# Patient Record
(demographics unavailable — no encounter records)

---

## 2020-10-16 NOTE — NUR
CARE ASSUMED, PT RESTING ON STRETCHER WITH EYES CLOSED, NO S/S OF DISTRESS OR
DISCOMFORT, CALL BELL WITHIN REACH.

## 2020-10-16 NOTE — NUR
Group Topic:  Monitoring Safety and Relapse    Date: March 18  Start Time:  1:00 PM  End Time:  4:00 PM    Number in attendance: 6    Safety Concerns: Suicidal ideation and Urge to harm self  Types of Safety Concerns: SI 8/10; PHILIP 6/10  Physical Concerns: Yes, recent surgery and chronic pain issues  Use of Street Drugs: No  Taking Medication as Prescribed?: Yes    Pt stated that her thoughts remain, but have improved since coming to treatment and are much more management. Pt denied and plan or intent, and explicitly stated she would never hurt herself because of her son.  Pt expressed she managaes thoughts through distractions, talking to her best friend, and thinking about her son.    Diana Talavera, LPC       PT RESTING TOLERATED AM MEAL WITHOUT INCIDENT, CALL DEEDEE SEVERINO

## 2020-10-16 NOTE — NUR
CARE ASSUMED, PT RESTING WITH EYE CLOSED, NO S/S OF DISTRESS OR DISCOMFORT
NOTED, CALL BELL WITHIN REACH.